# Patient Record
Sex: FEMALE | Race: WHITE | Employment: FULL TIME | URBAN - METROPOLITAN AREA
[De-identification: names, ages, dates, MRNs, and addresses within clinical notes are randomized per-mention and may not be internally consistent; named-entity substitution may affect disease eponyms.]

---

## 2017-04-18 PROCEDURE — 87624 HPV HI-RISK TYP POOLED RSLT: CPT | Performed by: INTERNAL MEDICINE

## 2017-04-18 PROCEDURE — 88175 CYTOPATH C/V AUTO FLUID REDO: CPT | Performed by: INTERNAL MEDICINE

## 2017-08-16 PROBLEM — R10.2 PELVIC PAIN: Status: ACTIVE | Noted: 2017-08-16

## 2018-04-20 ENCOUNTER — MED REC SCAN ONLY (OUTPATIENT)
Dept: FAMILY MEDICINE CLINIC | Facility: CLINIC | Age: 48
End: 2018-04-20

## 2018-04-20 ENCOUNTER — OFFICE VISIT (OUTPATIENT)
Dept: FAMILY MEDICINE CLINIC | Facility: CLINIC | Age: 48
End: 2018-04-20

## 2018-04-20 VITALS
TEMPERATURE: 98 F | HEIGHT: 64.5 IN | DIASTOLIC BLOOD PRESSURE: 80 MMHG | SYSTOLIC BLOOD PRESSURE: 110 MMHG | WEIGHT: 115.19 LBS | BODY MASS INDEX: 19.43 KG/M2

## 2018-04-20 DIAGNOSIS — Z71.84 COUNSELING FOR TRAVEL: ICD-10-CM

## 2018-04-20 DIAGNOSIS — Z71.9 ENCOUNTER FOR CONSULTATION: Primary | ICD-10-CM

## 2018-04-20 PROCEDURE — 90472 IMMUNIZATION ADMIN EACH ADD: CPT | Performed by: FAMILY MEDICINE

## 2018-04-20 PROCEDURE — 90715 TDAP VACCINE 7 YRS/> IM: CPT | Performed by: FAMILY MEDICINE

## 2018-04-20 PROCEDURE — 99204 OFFICE O/P NEW MOD 45 MIN: CPT | Performed by: FAMILY MEDICINE

## 2018-04-20 PROCEDURE — 90707 MMR VACCINE SC: CPT | Performed by: FAMILY MEDICINE

## 2018-04-20 PROCEDURE — 90471 IMMUNIZATION ADMIN: CPT | Performed by: FAMILY MEDICINE

## 2018-04-20 RX ORDER — AZITHROMYCIN 500 MG/1
TABLET, FILM COATED ORAL
Qty: 5 TABLET | Refills: 0 | Status: SHIPPED
Start: 2018-04-20 | End: 2019-04-15

## 2018-04-20 RX ORDER — AZITHROMYCIN 500 MG/1
TABLET, FILM COATED ORAL
Qty: 5 TABLET | Refills: 0 | Status: SHIPPED
Start: 2018-04-20 | End: 2018-04-20

## 2018-04-20 NOTE — PROGRESS NOTES
HPI:   Patient presents with:  Counseling: travel to [place], depart 6/8/18 x 8 days      Crispin Aguirre is a 49year old female. who presents primarily presents for [facility]:  Counseling, Advice and Immunizations    · Patient will be traveli Exam performed for new constipation, change in               stool caliber.   Findings: internal hemorrhoids,               otherwise normal.  Repeat at age 47.  4/16/2015: COLONOSCOPY,DIAGNOSTIC N/A      Comment: Procedure: COLONOSCOPY, POSSIBLE BIOPSY, no S3 S4  EXT: no pedal edema, PT pulses wnl B  GI: NABS  NEURO: A&O x 3, motor and sensory grossly intact B UE and LE, nl gait  MS: no significant joint deformity, FROM B UE/LE  PSYCH:  mood and affect WNL, speech and thought congruent    ASSESSMENT AND P Delayed Release; Take 1 capsule by mouth every other day. Dispense: 4 capsule; Refill: 0  - azithromycin 500 MG Oral Tab;  Take one tablet by mouth once a day as needed until symptoms of TRAVELERS' DIARRHEA resolve or are significantly improved; take for u

## 2019-04-15 PROBLEM — Z82.62 FAMILY HISTORY OF OSTEOPOROSIS: Status: ACTIVE | Noted: 2019-04-15

## 2019-04-15 PROBLEM — R10.2 PELVIC PAIN: Status: RESOLVED | Noted: 2017-08-16 | Resolved: 2019-04-15

## 2020-02-06 PROBLEM — N95.2 VAGINAL ATROPHY: Status: ACTIVE | Noted: 2020-02-06

## 2020-02-06 PROBLEM — N94.10 DYSPAREUNIA, FEMALE: Status: ACTIVE | Noted: 2020-02-06

## 2020-02-06 PROBLEM — N95.9 POSTMENOPAUSAL SYMPTOMS: Status: ACTIVE | Noted: 2020-02-06

## 2020-12-03 PROBLEM — Z79.890 HORMONE REPLACEMENT THERAPY (HRT): Status: ACTIVE | Noted: 2020-12-03

## 2020-12-03 PROBLEM — M81.8 OTHER OSTEOPOROSIS WITHOUT CURRENT PATHOLOGICAL FRACTURE: Status: ACTIVE | Noted: 2020-12-03

## 2020-12-03 PROBLEM — N92.0 SPOTTING: Status: ACTIVE | Noted: 2020-12-03

## 2020-12-17 ENCOUNTER — IMMUNIZATION (OUTPATIENT)
Dept: LAB | Facility: HOSPITAL | Age: 50
End: 2020-12-17
Attending: PREVENTIVE MEDICINE
Payer: COMMERCIAL

## 2020-12-17 DIAGNOSIS — Z23 NEED FOR VACCINATION: ICD-10-CM

## 2020-12-17 PROCEDURE — 0001A PFIZER-BIONTECH COVID-19 VACCINE: CPT

## 2021-01-07 ENCOUNTER — IMMUNIZATION (OUTPATIENT)
Dept: LAB | Facility: HOSPITAL | Age: 51
End: 2021-01-07
Attending: PREVENTIVE MEDICINE
Payer: COMMERCIAL

## 2021-01-07 DIAGNOSIS — Z23 NEED FOR VACCINATION: ICD-10-CM

## 2021-01-07 PROCEDURE — 0002A SARSCOV2 VAC 30MCG/0.3ML IM: CPT
